# Patient Record
Sex: FEMALE | Race: WHITE | Employment: FULL TIME | URBAN - METROPOLITAN AREA
[De-identification: names, ages, dates, MRNs, and addresses within clinical notes are randomized per-mention and may not be internally consistent; named-entity substitution may affect disease eponyms.]

---

## 2019-06-11 ENCOUNTER — THERAPY VISIT (OUTPATIENT)
Dept: OCCUPATIONAL THERAPY | Facility: CLINIC | Age: 64
End: 2019-06-11
Payer: COMMERCIAL

## 2019-06-11 DIAGNOSIS — M79.641 BILATERAL HAND PAIN: Primary | ICD-10-CM

## 2019-06-11 DIAGNOSIS — M79.642 BILATERAL HAND PAIN: Primary | ICD-10-CM

## 2019-06-11 DIAGNOSIS — R20.2 PARESTHESIA OF BOTH HANDS: ICD-10-CM

## 2019-06-11 PROCEDURE — 97140 MANUAL THERAPY 1/> REGIONS: CPT | Mod: GO | Performed by: OCCUPATIONAL THERAPIST

## 2019-06-11 PROCEDURE — 97165 OT EVAL LOW COMPLEX 30 MIN: CPT | Mod: GO | Performed by: OCCUPATIONAL THERAPIST

## 2019-06-11 PROCEDURE — 97760 ORTHOTIC MGMT&TRAING 1ST ENC: CPT | Mod: GO | Performed by: OCCUPATIONAL THERAPIST

## 2019-06-11 NOTE — LETTER
"[facility]  [address] [facility] [address]  [place] [address]  [phone]    2019    Re: Charmaine Joel   :   1955  MRN:  [id number]   REFERRING PHYSICIAN:   Sammi Caro    [facility] [place] [facility]    Date of Initial Evaluation:  19  Visits:  Rxs Used: 1(late arrival)  Reason for Referral:     Bilateral hand pain  Paresthesia of both hands    EVALUATION SUMMARY    Hand Therapy Initial Evaluation    Current Date:  2019    Subjective:  Charmaine (\"Jeri\") KEYANNA Joel is a 63 year old left hand dominant female.    Diagnosis:  Bilateral hand pain and numbness/tingling (left > right)  DOI:  3/6/19 (therapy referral)    Patient reports symptoms of pain, weakness/loss of strength, numbness and tingling of bilateral hands which occurred due to gradual onset over the past year. Since onset symptoms are gradually getting worse in the past 6 months.  Special tests:  none.  Previous treatment: night OTC wrist/thumb splints; wearing only 2 nights per week on average.  General health as reported by patient is good.  Pertinent medical history includes:Depression, Migraines/Headaches, Smoking, Severe Headaches  Medical allergies:none.  Surgical history: orthopedic: foot.  Medication history: none per pt report.    Occupational Profile Information:  Current occupation is Deli Assistant   Currently not working due to foot surgery in 2019  Job Tasks: Lifting, Carrying, Pushing, Pulling, Repetitive Tasks  Prior functional level:  independent-have help in some areas  Barriers include:transportation (sister drove her)  Mobility: Wearing walking boot due to recent foot surgery, no cane or aids   Transportation: unable to use normal mode of transportation, due to foot sugery    Functional Outcome Measure:  Upper Extremity Functional Index  SCORE:   Column Totals: 55/80  (A lower score indicates greater " disability.)  Charmaine Joel   :   1955        Objective:  Pain Level (Scale 0-10)   19   At Rest 0 right  3-4 left   With Use 3 right  10 left     Pain Description  Date 19   Location Hands and shoots up arm   Pain Quality Sharp, Shooting, Tingling and Piercing    Frequency constant     Pain is worst  daytime and nighttime   Exacerbated by  writing, pushing, pinching   Relieved by rest and splints   Progression Gradually getting worse     Edema   None on observation    Sensation  Decreased in glove like distribution per pt report (left > right)    ROM  WNL wrists and fingers. No thenar or intrinsic atrophy noted on exam  Thumb 2019   AROM(PROM) Right Left   PAbd 40 35   RAbd 40 35     Strength     (Measured in pounds)  Pain Report:  + mild    ++ moderate    +++ severe    19   Trials Right Left   1  2  3 14+  18+  18+ 18+  14+  22+   Average: 17 18     Lat Pinch 19   Trials Right Left   1  2  3 8  8  8+ 10  10  10   Average: 8 10     Charmaine Joel   :   1955        3 Pt Pinch 19   Trials Right Left   1  2  3 6+  7+  7+ 7  8  8   Average: 7 8     Special Tests   2019   Tinels at carpal tunnel R:-  L:-   Phalen R:  L:+ slight at 50 secs median nerve   Tinels at cubital tunnel R:-  L:-   Elbow flexion test R:-  L:-   ULTT Median nerve R:-  L:-   Thumb CMC grind R:+ slight  L:+ slight   Thumb CMC passive retroposition  R:++  L:++   Trigger of thumb R:-  L:-   Finkelstein R:-  L:-     Palpation   2019   CMC joint thumb R:-  L:-   Thumb Adductor R:-  L:+   MEP R:-  L:-   Flexors R:-  L:-   LEP R:-  L:-   Extensors R:-  L:-                 Charmaine Joel   :   1955          Cervical Screen:  (tested active with gentle overpressure)   19   Rotation Right -   Rotation Left -   Flexion -   Extension -   Lateral Flexion Right -   Lateral Flexion Left -   Compression -   Traction -   Comments:   No change in  sensory symptoms with cervical screen    Assessment:  Patient presents with symptoms consistent with diagnosis of bilateral hand pain local to CMC joint and bilateral paraesthesia which was difficult to localize today on exam; (possible CTS), with conservative intervention.     Patient's limitations or Problem List includes:  Pain, Decreased ROM/motion, Weakness, Sensory disturbance, Decreased , Decreased pinch and Tightness in musculature of bilateral hands and thumbs which interferes with the patient's ability to perform Self Care Tasks (dressing, eating), Work Tasks, Recreational Activities and Household Chores as compared to previous level of function.    Rehab Potential:  Good - Return to full activity, some limitations    Patient will benefit from skilled Occupational Therapy to increase ROM, flexibility, overall strength,  strength, pinch strength, stability of thumb and sensation and decrease pain to return to previous activity level and resume normal daily tasks and to reach their rehab potential.    Barriers to Learning:  No barrier    Communication Issues:  Patient appears to be able to clearly communicate and understand verbal and written communication and follow directions correctly.    Chart Review: Chart Review and Simple history review with patient    Identified Performance Deficits: dressing, home establishment and management, meal preparation and cleanup, work and leisure activities    Assessment of Occupational Performance:  5 or more Performance Deficits    Clinical Decision Making (Complexity): Low complexity  Treatment Explanation:  The following has been discussed with the patient:  RX ordered/plan of care  Anticipated outcomes  Possible risks and side effects  Charmaine Joel   :   1955        Plan:  Frequency:  2 X a month, once daily  Duration:  for 2 months  Treatment Plan:    Modalities:  Paraffin  Therapeutic Exercise:  AROM, PROM, Tendon Gliding, Isotonics, Isometrics  and Stabilization  Neuromuscular re-education:  Nerve Gliding, Posture and Kinesiotaping  Manual Techniques:  Joint mobilization, Friction massage and Myofascial release  Orthotic Fabrication:  Hand and Forearm based orthoses  Self Care:  Self Care Tasks and Ergonomic Considerations    Discharge Plan:  Achieve all LTG.  Independent in home treatment program.  Reach maximal therapeutic benefit.    Home Exercise Program:  Warmth for pain and stiffness  1st web release with clip and small ball  Self CMC mobilization on chest  Left custom CMC neoprene cool comfort orthosis during the day with ADL s per symptoms  Wear OTC VINOD wrist/thumb splint sleeping nightly  Monitor distribution of paraesthesia for more localization   Avoid prolonged wrist flexion with ADL's  Incorporate joint protection into daily functional activities    Next Visit:  See in 1-2 weeks  Assess response to HEP and left comfort cool orthosis, consider for right; assess night OTC splints  Consider trial of Paraffin  Add Thumb Stabilization Program with hard  C  and index abd  Consider custom VINOD Hand based Thumb Spica orthoses to wear night/sleeping  Review and issue adaptive equipment resources   Continue to assess nerve symptoms  Add tendon and median nerve gliding, pec stretches and postural exercises     Thank you for your referral.    INQUIRIES  Therapist: DAVE Ruiz/ROXANNE, CHT  [facility]  [address] [address]  [place] [address]  Phone: [phone]  Fax: [phone]

## 2019-06-11 NOTE — PROGRESS NOTES
"Hand Therapy Initial Evaluation    Current Date:  6/11/2019    Subjective:  Charmaine (\"Jeri\") KEYANNA Joel is a 63 year old left hand dominant female.    Diagnosis:  Bilateral hand pain and numbness/tingling (left > right)  DOI:  3/6/19 (therapy referral)    Patient reports symptoms of pain, weakness/loss of strength, numbness and tingling of bilateral hands which occurred due to gradual onset over the past year. Since onset symptoms are gradually getting worse in the past 6 months.  Special tests:  none.  Previous treatment: night OTC wrist/thumb splints; wearing only 2 nights per week on average.  General health as reported by patient is good.  Pertinent medical history includes:Depression, Migraines/Headaches, Smoking, Severe Headaches  Medical allergies:none.  Surgical history: orthopedic: foot.  Medication history: none per pt report.    Occupational Profile Information:  Current occupation is Deli Assistant   Currently not working due to foot surgery in April 2019  Job Tasks: Lifting, Carrying, Pushing, Pulling, Repetitive Tasks  Prior functional level:  independent-have help in some areas  Barriers include:transportation (sister drove her)  Mobility: Wearing walking boot due to recent foot surgery, no cane or aids   Transportation: unable to use normal mode of transportation, due to foot sugery    Functional Outcome Measure:  Upper Extremity Functional Index  SCORE:   Column Totals: 55/80  (A lower score indicates greater disability.)    Objective:  Pain Level (Scale 0-10)   6/11/19   At Rest 0 right  3-4 left   With Use 3 right  10 left     Pain Description  Date 6/11/19   Location Hands and shoots up arm   Pain Quality Sharp, Shooting, Tingling and Piercing    Frequency constant     Pain is worst  daytime and nighttime   Exacerbated by  writing, pushing, pinching   Relieved by rest and splints   Progression Gradually getting worse     Edema   None on observation    Sensation  Decreased in glove like distribution " per pt report (left > right)    ROM  WNL wrists and fingers. No thenar or intrinsic atrophy noted on exam  Thumb 6/11/2019 6/11/2019   AROM(PROM) Right Left   PAbd 40 35   RAbd 40 35     Strength     (Measured in pounds)  Pain Report:  + mild    ++ moderate    +++ severe    6/11/19 6/11/19   Trials Right Left   1  2  3 14+  18+  18+ 18+  14+  22+   Average: 17 18     Lat Pinch 6/11/19 6/11/19   Trials Right Left   1  2  3 8  8  8+ 10  10  10   Average: 8 10     3 Pt Pinch 6/11/19 6/11/2019   Trials Right Left   1  2  3 6+  7+  7+ 7  8  8   Average: 7 8     Special Tests   6/11/2019   Tinels at carpal tunnel R:-  L:-   Phalen R:  L:+ slight at 50 secs median nerve   Tinels at cubital tunnel R:-  L:-   Elbow flexion test R:-  L:-   ULTT Median nerve R:-  L:-   Thumb CMC grind R:+ slight  L:+ slight   Thumb CMC passive retroposition  R:++  L:++   Trigger of thumb R:-  L:-   Finkelstein R:-  L:-     Palpation   6/11/2019   CMC joint thumb R:-  L:-   Thumb Adductor R:-  L:+   MEP R:-  L:-   Flexors R:-  L:-   LEP R:-  L:-   Extensors R:-  L:-     Cervical Screen:  (tested active with gentle overpressure)   6/11/19   Rotation Right -   Rotation Left -   Flexion -   Extension -   Lateral Flexion Right -   Lateral Flexion Left -   Compression -   Traction -   Comments:   No change in sensory symptoms with cervical screen    Assessment:  Patient presents with symptoms consistent with diagnosis of bilateral hand pain local to CMC joint and bilateral paraesthesia which was difficult to localize today on exam; (possible CTS), with conservative intervention.     Patient's limitations or Problem List includes:  Pain, Decreased ROM/motion, Weakness, Sensory disturbance, Decreased , Decreased pinch and Tightness in musculature of bilateral hands and thumbs which interferes with the patient's ability to perform Self Care Tasks (dressing, eating), Work Tasks, Recreational Activities and Household Chores as compared to  previous level of function.    Rehab Potential:  Good - Return to full activity, some limitations    Patient will benefit from skilled Occupational Therapy to increase ROM, flexibility, overall strength,  strength, pinch strength, stability of thumb and sensation and decrease pain to return to previous activity level and resume normal daily tasks and to reach their rehab potential.    Barriers to Learning:  No barrier    Communication Issues:  Patient appears to be able to clearly communicate and understand verbal and written communication and follow directions correctly.    Chart Review: Chart Review and Simple history review with patient    Identified Performance Deficits: dressing, home establishment and management, meal preparation and cleanup, work and leisure activities    Assessment of Occupational Performance:  5 or more Performance Deficits    Clinical Decision Making (Complexity): Low complexity    Treatment Explanation:  The following has been discussed with the patient:  RX ordered/plan of care  Anticipated outcomes  Possible risks and side effects    Plan:  Frequency:  2 X a month, once daily  Duration:  for 2 months  Treatment Plan:    Modalities:  Paraffin  Therapeutic Exercise:  AROM, PROM, Tendon Gliding, Isotonics, Isometrics and Stabilization  Neuromuscular re-education:  Nerve Gliding, Posture and Kinesiotaping  Manual Techniques:  Joint mobilization, Friction massage and Myofascial release  Orthotic Fabrication:  Hand and Forearm based orthoses  Self Care:  Self Care Tasks and Ergonomic Considerations    Discharge Plan:  Achieve all LTG.  Independent in home treatment program.  Reach maximal therapeutic benefit.    Home Exercise Program:  Warmth for pain and stiffness  1st web release with clip and small ball  Self CMC mobilization on chest  Left custom CMC neoprene cool comfort orthosis during the day with ADL s per symptoms  Wear OTC VINOD wrist/thumb splint sleeping nightly  Monitor  distribution of paraesthesia for more localization   Avoid prolonged wrist flexion with ADL's  Incorporate joint protection into daily functional activities    Next Visit:  See in 1-2 weeks  Assess response to HEP and left comfort cool orthosis, consider for right; assess night OTC splints  Consider trial of Paraffin  Add Thumb Stabilization Program with hard  C  and index abd  Consider custom VINOD Hand based Thumb Spica orthoses to wear night/sleeping  Review and issue adaptive equipment resources   Continue to assess nerve symptoms  Add tendon and median nerve gliding, pec stretches and postural exercises

## 2019-08-12 PROBLEM — M79.642 BILATERAL HAND PAIN: Status: RESOLVED | Noted: 2019-06-11 | Resolved: 2019-08-12

## 2019-08-12 PROBLEM — R20.2 PARESTHESIA OF BOTH HANDS: Status: RESOLVED | Noted: 2019-06-11 | Resolved: 2019-08-12

## 2019-08-12 PROBLEM — M79.641 BILATERAL HAND PAIN: Status: RESOLVED | Noted: 2019-06-11 | Resolved: 2019-08-12
